# Patient Record
Sex: MALE | Race: WHITE | URBAN - METROPOLITAN AREA
[De-identification: names, ages, dates, MRNs, and addresses within clinical notes are randomized per-mention and may not be internally consistent; named-entity substitution may affect disease eponyms.]

---

## 2019-12-12 ENCOUNTER — NEW PATIENT (OUTPATIENT)
Dept: URBAN - METROPOLITAN AREA CLINIC 44 | Facility: CLINIC | Age: 39
End: 2019-12-12
Payer: COMMERCIAL

## 2019-12-12 DIAGNOSIS — E11.3393 DIABETES MELLITUS TYPE 2 WITH MODERATE NON-PROLIFE: ICD-10-CM

## 2019-12-12 PROCEDURE — 92004 COMPRE OPH EXAM NEW PT 1/>: CPT | Performed by: OPTOMETRIST

## 2019-12-12 PROCEDURE — 92015 DETERMINE REFRACTIVE STATE: CPT | Performed by: OPTOMETRIST

## 2019-12-12 ASSESSMENT — VISUAL ACUITY
OD: 20/20
OS: 20/20

## 2019-12-12 ASSESSMENT — INTRAOCULAR PRESSURE
OD: 17
OS: 17

## 2021-08-06 ENCOUNTER — OFFICE VISIT (OUTPATIENT)
Dept: URBAN - METROPOLITAN AREA CLINIC 44 | Facility: CLINIC | Age: 41
End: 2021-08-06
Payer: COMMERCIAL

## 2021-08-06 DIAGNOSIS — E11.39 TYPE 2 DIABETES MELLITUS WITH OPHTHALMIC COMPLICAT: ICD-10-CM

## 2021-08-06 DIAGNOSIS — Z79.4 LONG TERM (CURRENT) USE OF INSULIN: ICD-10-CM

## 2021-08-06 PROCEDURE — 92134 CPTRZ OPH DX IMG PST SGM RTA: CPT | Performed by: OPTOMETRIST

## 2021-08-06 PROCEDURE — 92014 COMPRE OPH EXAM EST PT 1/>: CPT | Performed by: OPTOMETRIST

## 2021-08-06 ASSESSMENT — KERATOMETRY
OS: 42.00
OD: 41.25

## 2021-08-06 ASSESSMENT — INTRAOCULAR PRESSURE
OS: 17
OD: 17

## 2021-08-06 ASSESSMENT — VISUAL ACUITY
OS: 20/25
OD: 20/25

## 2021-08-06 NOTE — IMPRESSION/PLAN
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 Discussed ocular and systemic benefits of blood sugar control. Check annually. Send report to PCP.